# Patient Record
(demographics unavailable — no encounter records)

---

## 2025-02-04 NOTE — HISTORY OF PRESENT ILLNESS
[FreeTextEntry1] : This is a 57-year-old female for annual health assessment Specifically we will review her history of BRCA positivity status post bilateral mastectomy and oophorectomy with reconstruction.  She also carries a diagnosis of kidney stones and Crohn's disease. [de-identified] : Patient is feeling well she states her health is very good and her mood is very good she has no complaints

## 2025-02-04 NOTE — HEALTH RISK ASSESSMENT
[Yes] : Yes [No] : In the past 12 months have you used drugs other than those required for medical reasons? No [No falls in past year] : Patient reported no falls in the past year [0] : 2) Feeling down, depressed, or hopeless: Not at all (0) [PHQ-2 Negative - No further assessment needed] : PHQ-2 Negative - No further assessment needed [Never] : Never [NO] : No [Alone] : lives alone [College] : College [] :  [Sexually Active] : sexually active [Feels Safe at Home] : Feels safe at home [Fully functional (bathing, dressing, toileting, transferring, walking, feeding)] : Fully functional (bathing, dressing, toileting, transferring, walking, feeding) [Fully functional (using the telephone, shopping, preparing meals, housekeeping, doing laundry, using] : Fully functional and needs no help or supervision to perform IADLs (using the telephone, shopping, preparing meals, housekeeping, doing laundry, using transportation, managing medications and managing finances) [Smoke Detector] : smoke detector [Carbon Monoxide Detector] : carbon monoxide detector [Seat Belt] :  uses seat belt [Sunscreen] : uses sunscreen [I will adhere to the patient's wishes.] : I will adhere to the patient's wishes. [[id number]] : 0 [Change in mental status noted] : No change in mental status noted [Language] : denies difficulty with language [Behavior] : denies difficulty with behavior [Learning/Retaining New Information] : denies difficulty learning/retaining new information [Handling Complex Tasks] : denies difficulty handling complex tasks [Reasoning] : denies difficulty with reasoning [Reports changes in hearing] : Reports no changes in hearing [Reports changes in vision] : Reports no changes in vision [Reports changes in dental health] : Reports no changes in dental health [Safety elements used in home] : no safety elements used in home [Travel to Developing Areas] : does not  travel to developing areas [TB Exposure] : is not being exposed to tuberculosis [Caregiver Concerns] : does not have caregiver concerns

## 2025-02-04 NOTE — PHYSICAL EXAM
[No Acute Distress] : no acute distress [Well Nourished] : well nourished [Well Developed] : well developed [Well-Appearing] : well-appearing [Normal Sclera/Conjunctiva] : normal sclera/conjunctiva [PERRL] : pupils equal round and reactive to light [EOMI] : extraocular movements intact [Normal Outer Ear/Nose] : the outer ears and nose were normal in appearance [Normal Oropharynx] : the oropharynx was normal [No JVD] : no jugular venous distention [No Lymphadenopathy] : no lymphadenopathy [Supple] : supple [Thyroid Normal, No Nodules] : the thyroid was normal and there were no nodules present [No Respiratory Distress] : no respiratory distress  [No Accessory Muscle Use] : no accessory muscle use [Clear to Auscultation] : lungs were clear to auscultation bilaterally [Normal Rate] : normal rate  [Regular Rhythm] : with a regular rhythm [Normal S1, S2] : normal S1 and S2 [No Murmur] : no murmur heard [No Carotid Bruits] : no carotid bruits [No Abdominal Bruit] : a ~M bruit was not heard ~T in the abdomen [No Varicosities] : no varicosities [Pedal Pulses Present] : the pedal pulses are present [No Edema] : there was no peripheral edema [No Palpable Aorta] : no palpable aorta [No Extremity Clubbing/Cyanosis] : no extremity clubbing/cyanosis [Soft] : abdomen soft [Non Tender] : non-tender [Non-distended] : non-distended [No Masses] : no abdominal mass palpated [No HSM] : no HSM [Normal Bowel Sounds] : normal bowel sounds [Normal Posterior Cervical Nodes] : no posterior cervical lymphadenopathy [Normal Anterior Cervical Nodes] : no anterior cervical lymphadenopathy [No CVA Tenderness] : no CVA  tenderness [No Spinal Tenderness] : no spinal tenderness [No Joint Swelling] : no joint swelling [Grossly Normal Strength/Tone] : grossly normal strength/tone [No Rash] : no rash [Coordination Grossly Intact] : coordination grossly intact [No Focal Deficits] : no focal deficits [Normal Gait] : normal gait [Deep Tendon Reflexes (DTR)] : deep tendon reflexes were 2+ and symmetric [Normal Affect] : the affect was normal [Normal Insight/Judgement] : insight and judgment were intact [de-identified] : Status post reconstruction

## 2025-02-04 NOTE — ASSESSMENT
[FreeTextEntry1] : This is a 57-year-old female whose history has been reviewed above  She has a history of being BRCA positive she is status post nipple sparing surgery 2022 she continues to follow-up with MRIs as directed by breast surgery  She has a history of bilateral prophylactic oophorectomy  She has a history of Crohn's disease which is not active  She has a history of osteoporosis she had been on Prolia she was followed by [facility] and currently she is not on medication  She did have mild lymphopenia she was evaluated by hematology extensively serologically including TOREY protein electrophoresis hepatitis and was found to be negative a repeat CBC was of course obtained  She does have a history of renal stones she did have lithotripsy and does know to stay well-hydrated  She has a history of Crohn's disease she is on Pentasa followed  by GI and is up-to-date with colonoscopy  She exercises vigorously she remains lean and eats a heart healthy diet  She did have an episode earlier in the year which she in retrospect thinks was vertigo.  She has had no recurrence her neurologic exam is normal cardiogram is normal  She does follow-up with OB/GYN she has received shingles vaccine declined flu and COVID DISCHARGE

## 2025-03-19 NOTE — END OF VISIT
[FreeTextEntry3] : I, Rosy Lee, acted as a scribe on behalf of Dr. Stefani Wilson D.O. on 03/19/2025.  All medical entries made by the scribe were at my, Dr. Stefani Wilson D.O, direction and personally dictated by me on 03/19/2025. I have reviewed the chart and agree that the record accurately reflects my personal performance of the history, physical exam, assessment and plan. I have also personally directed, reviewed, and agreed with the chart.

## 2025-03-19 NOTE — HISTORY OF PRESENT ILLNESS
[FreeTextEntry1] : 57 year old female presents for an annual. Pt is doing well with no complaints.  PSH: s/p BSO, bilateral mastectomy and reconstruction - BRCA positive.

## 2025-03-19 NOTE — PLAN
[FreeTextEntry1] : 57 year old female presents for an annual  -PAP done -F/u with breast surgeon  RTO in 1 year